# Patient Record
Sex: FEMALE | Race: BLACK OR AFRICAN AMERICAN | NOT HISPANIC OR LATINO
[De-identification: names, ages, dates, MRNs, and addresses within clinical notes are randomized per-mention and may not be internally consistent; named-entity substitution may affect disease eponyms.]

---

## 2019-08-29 ENCOUNTER — APPOINTMENT (OUTPATIENT)
Dept: ORTHOPEDIC SURGERY | Facility: CLINIC | Age: 84
End: 2019-08-29

## 2019-09-12 ENCOUNTER — APPOINTMENT (OUTPATIENT)
Dept: ORTHOPEDIC SURGERY | Facility: CLINIC | Age: 84
End: 2019-09-12
Payer: MEDICARE

## 2019-09-12 VITALS — BODY MASS INDEX: 30.32 KG/M2 | WEIGHT: 182 LBS | HEIGHT: 65 IN

## 2019-09-12 DIAGNOSIS — M16.0 BILATERAL PRIMARY OSTEOARTHRITIS OF HIP: ICD-10-CM

## 2019-09-12 DIAGNOSIS — M17.0 BILATERAL PRIMARY OSTEOARTHRITIS OF KNEE: ICD-10-CM

## 2019-09-12 PROCEDURE — 73502 X-RAY EXAM HIP UNI 2-3 VIEWS: CPT | Mod: 26,LT

## 2019-09-12 PROCEDURE — 73562 X-RAY EXAM OF KNEE 3: CPT | Mod: 50

## 2019-09-12 PROCEDURE — 99203 OFFICE O/P NEW LOW 30 MIN: CPT

## 2019-09-12 NOTE — DISCUSSION/SUMMARY
[de-identified] : The underlying pathophysiology was reviewed in great detail with the patient as well as the various treatment options, including ice, analgesics, NSAIDs, Physical therapy, steroid injections and arthoplasty. \par \par I recommend that she follow up with Dr. Ji.\par \par An order was placed for a fluoroscopic guided corticosteroid injection to the left hip.\par \par FU after injections are obtained.

## 2019-09-12 NOTE — ADDENDUM
[FreeTextEntry1] : I, Adore Coleman, acted solely as a scribe for Dr. Niraj Edward on this date 09/12/2019.

## 2019-09-12 NOTE — PHYSICAL EXAM
[Normal LLE] : Left Lower Extremity: No scars, rashes, lesions, ulcers, skin intact [Normal] : No swelling, no edema, normal pedal pulses and normal temperature [Normal Touch] : sensation intact for touch [Poor Appearance] : well-appearing [de-identified] : Right Lower Extremity\par o Knee :\par ¦ Inspection/Palpation : tenderness along the medial knee, no swelling, no deformity\par ¦ Range of Motion : 0 - 100 degrees, no crepitus\par ¦ Stability : no valgus or varus instability present on provocative testing, Lachman’s Test (-)\par o Muscle Tone : tone normal\par o Muscle Bulk : normal muscle bulk present\par o Skin : no erythema, no ecchymosis\par o Sensation : sensation to light touch intact\par o Vascular Exam : no edema, no cyanosis, dorsalis pedis artery pulse 2+, posterior tibial artery pulse 2+ \par \par Left Lower Extremity\par o Hip :\par ¦ Inspection/Palpation : no tenderness, no swelling, no deformity\par ¦ Range of Motion : hip flexion to 90 degrees with pain, external rotation to 20 degrees, no internal rotation, no crepitus\par ¦ Stability : joint stability intact\par ¦ Tests and Signs : all tests for stability normal\par o Knee :\par ¦ Inspection/Palpation : tenderness along the medial knee, no swelling, no deformity\par ¦ Range of Motion : 0 - 100 degrees, no crepitus\par ¦ Stability : no valgus or varus instability present on provocative testing, Lachman’s Test (-)\par o Muscle Tone : tone normal\par o Muscle Bulk : normal muscle bulk present\par o Skin : no erythema, no ecchymosis\par o Sensation : sensation to light touch intact\par o Vascular Exam : no edema, no cyanosis, dorsalis pedis artery pulse 2+, posterior tibial artery pulse 2+  [Acute Distress] : not in acute distress [de-identified] : o Left Hip and pelvis : AP and lateral views were obtained, there are no soft tissue abnormalities, no fractures, severe left hip osteoarthritis with collapse of left femoral head and sclerosis of the head and acetabulum, severe right hip osteoarthritis with marked joint space narrowing. \par \par o Right Knee : AP, lateral, and skyline views of the knee were obtained, there are no soft tissue abnormalities, no fractures, alignment is normal, moderate tricompartmental osteoarthritis with medial joint space narrowing, normal bone density, no bony lesions. \par \par o Left Knee : AP, lateral, and skyline views of the knee were obtained, there are no soft tissue abnormalities, no fractures, alignment is normal, moderate tricompartmental osteoarthritis with medial joint space narrowing, normal bone density, no bony lesions. \par

## 2019-09-12 NOTE — HISTORY OF PRESENT ILLNESS
[de-identified] : 89 year old female presents for an evaluation of left hip pain that she cannot attribute to any specific injury or event. Of note, she was supposed to have a hip replacement with Dr. Ji a few years ago, but she sustained a fall, causing her to decline surgery at the time. She presents to the office ambulating with the assistance of a wheelchair and is accompanied by her daughter who is contributing to her medical history. Today she rates her pain a 10/10 and describes an aching pain located in the groin region of her left hip that radiates down her left lower extremity to her left knee and that is constant in nature. Her symptoms are exacerbated with extended periods of walking, weight bearing, and with lying down, and are alleviated with heat. The patient has no other complaints at this time.

## 2019-09-12 NOTE — END OF VISIT
[FreeTextEntry3] : All medical record entries made by the Corinneibe were at my, Dr. Niraj Edward, direction and personally dictated by me on 09/12/2019. I have reviewed the chart and agree that the record accurately reflects my personal performance of the history, physical exam, assessment and plan. I have also personally directed, reviewed, and agreed with the chart.

## 2021-01-01 ENCOUNTER — EMERGENCY (EMERGENCY)
Facility: HOSPITAL | Age: 86
LOS: 1 days | Discharge: HOME | End: 2021-01-01
Attending: EMERGENCY MEDICINE | Admitting: EMERGENCY MEDICINE
Payer: MEDICARE

## 2021-01-01 VITALS
HEART RATE: 112 BPM | RESPIRATION RATE: 18 BRPM | DIASTOLIC BLOOD PRESSURE: 100 MMHG | HEIGHT: 65 IN | SYSTOLIC BLOOD PRESSURE: 170 MMHG | WEIGHT: 117.95 LBS | TEMPERATURE: 98 F | OXYGEN SATURATION: 98 %

## 2021-01-01 VITALS
DIASTOLIC BLOOD PRESSURE: 56 MMHG | HEART RATE: 67 BPM | SYSTOLIC BLOOD PRESSURE: 147 MMHG | RESPIRATION RATE: 18 BRPM | OXYGEN SATURATION: 96 % | TEMPERATURE: 98 F

## 2021-01-01 VITALS
HEART RATE: 71 BPM | DIASTOLIC BLOOD PRESSURE: 72 MMHG | RESPIRATION RATE: 16 BRPM | SYSTOLIC BLOOD PRESSURE: 142 MMHG | OXYGEN SATURATION: 98 %

## 2021-01-01 VITALS
WEIGHT: 169.98 LBS | SYSTOLIC BLOOD PRESSURE: 156 MMHG | OXYGEN SATURATION: 100 % | HEIGHT: 65 IN | HEART RATE: 96 BPM | RESPIRATION RATE: 18 BRPM | TEMPERATURE: 98 F | DIASTOLIC BLOOD PRESSURE: 85 MMHG

## 2021-01-01 VITALS
DIASTOLIC BLOOD PRESSURE: 74 MMHG | OXYGEN SATURATION: 97 % | HEART RATE: 75 BPM | TEMPERATURE: 97 F | WEIGHT: 119.93 LBS | SYSTOLIC BLOOD PRESSURE: 156 MMHG | RESPIRATION RATE: 18 BRPM | HEIGHT: 65 IN

## 2021-01-01 VITALS — HEART RATE: 100 BPM | DIASTOLIC BLOOD PRESSURE: 75 MMHG | SYSTOLIC BLOOD PRESSURE: 160 MMHG

## 2021-01-01 DIAGNOSIS — Z90.710 ACQUIRED ABSENCE OF BOTH CERVIX AND UTERUS: Chronic | ICD-10-CM

## 2021-01-01 DIAGNOSIS — Z90.49 ACQUIRED ABSENCE OF OTHER SPECIFIED PARTS OF DIGESTIVE TRACT: Chronic | ICD-10-CM

## 2021-01-01 PROCEDURE — 73030 X-RAY EXAM OF SHOULDER: CPT | Mod: 26,RT

## 2021-01-01 PROCEDURE — 73030 X-RAY EXAM OF SHOULDER: CPT

## 2021-01-01 PROCEDURE — 99284 EMERGENCY DEPT VISIT MOD MDM: CPT | Mod: 25

## 2021-01-01 PROCEDURE — 72192 CT PELVIS W/O DYE: CPT | Mod: 26,QQ

## 2021-01-01 PROCEDURE — 76376 3D RENDER W/INTRP POSTPROCES: CPT | Mod: 26

## 2021-01-01 PROCEDURE — 70450 CT HEAD/BRAIN W/O DYE: CPT | Mod: 26,MA

## 2021-01-01 PROCEDURE — 73700 CT LOWER EXTREMITY W/O DYE: CPT | Mod: MA

## 2021-01-01 PROCEDURE — 73630 X-RAY EXAM OF FOOT: CPT | Mod: 26,50

## 2021-01-01 PROCEDURE — 72170 X-RAY EXAM OF PELVIS: CPT | Mod: 26

## 2021-01-01 PROCEDURE — 72125 CT NECK SPINE W/O DYE: CPT | Mod: MA

## 2021-01-01 PROCEDURE — 73502 X-RAY EXAM HIP UNI 2-3 VIEWS: CPT | Mod: 26,LT

## 2021-01-01 PROCEDURE — 70450 CT HEAD/BRAIN W/O DYE: CPT | Mod: MA

## 2021-01-01 PROCEDURE — 76376 3D RENDER W/INTRP POSTPROCES: CPT

## 2021-01-01 PROCEDURE — 99284 EMERGENCY DEPT VISIT MOD MDM: CPT

## 2021-01-01 PROCEDURE — 72170 X-RAY EXAM OF PELVIS: CPT

## 2021-01-01 PROCEDURE — 72192 CT PELVIS W/O DYE: CPT | Mod: QQ

## 2021-01-01 PROCEDURE — 73630 X-RAY EXAM OF FOOT: CPT

## 2021-01-01 PROCEDURE — 73700 CT LOWER EXTREMITY W/O DYE: CPT | Mod: 26,LT,MA

## 2021-01-01 PROCEDURE — 72125 CT NECK SPINE W/O DYE: CPT | Mod: 26,MA

## 2021-01-01 PROCEDURE — 12011 RPR F/E/E/N/L/M 2.5 CM/<: CPT

## 2021-01-01 PROCEDURE — 73502 X-RAY EXAM HIP UNI 2-3 VIEWS: CPT

## 2021-01-01 RX ORDER — ACETAMINOPHEN 500 MG
650 TABLET ORAL ONCE
Refills: 0 | Status: COMPLETED | OUTPATIENT
Start: 2021-01-01 | End: 2021-01-01

## 2021-01-01 RX ORDER — MAGNESIUM HYDROXIDE 400 MG/1
0 TABLET, CHEWABLE ORAL
Qty: 0 | Refills: 0 | DISCHARGE

## 2021-01-01 RX ORDER — FLUTICASONE PROPIONATE 50 MCG
1 SPRAY, SUSPENSION NASAL
Qty: 0 | Refills: 0 | DISCHARGE

## 2021-01-01 RX ORDER — GABAPENTIN 400 MG/1
1 CAPSULE ORAL
Qty: 0 | Refills: 0 | DISCHARGE

## 2021-01-01 RX ORDER — SENNA PLUS 8.6 MG/1
1 TABLET ORAL
Qty: 0 | Refills: 0 | DISCHARGE

## 2021-01-01 RX ORDER — CARVEDILOL PHOSPHATE 80 MG/1
1 CAPSULE, EXTENDED RELEASE ORAL
Qty: 0 | Refills: 0 | DISCHARGE

## 2021-01-01 RX ORDER — DOCUSATE SODIUM 100 MG
1 CAPSULE ORAL
Qty: 0 | Refills: 0 | DISCHARGE

## 2021-01-01 RX ORDER — ACETAMINOPHEN 500 MG
0 TABLET ORAL
Qty: 0 | Refills: 0 | DISCHARGE

## 2021-01-01 RX ORDER — OMEPRAZOLE 10 MG/1
1 CAPSULE, DELAYED RELEASE ORAL
Qty: 0 | Refills: 0 | DISCHARGE

## 2021-01-01 RX ORDER — DIGOXIN 250 MCG
1 TABLET ORAL
Qty: 0 | Refills: 0 | DISCHARGE

## 2021-01-01 RX ORDER — FUROSEMIDE 40 MG
1 TABLET ORAL
Qty: 0 | Refills: 0 | DISCHARGE

## 2021-01-01 RX ORDER — APIXABAN 2.5 MG/1
1 TABLET, FILM COATED ORAL
Qty: 0 | Refills: 0 | DISCHARGE

## 2021-01-01 RX ADMIN — Medication 650 MILLIGRAM(S): at 17:15

## 2021-01-01 RX ADMIN — Medication 650 MILLIGRAM(S): at 16:53

## 2021-01-01 RX ADMIN — Medication 650 MILLIGRAM(S): at 04:18

## 2021-11-11 NOTE — ED PROVIDER NOTE - NSFOLLOWUPINSTRUCTIONS_ED_ALL_ED_FT
Fall Prevention for Older Adults    WHAT YOU NEED TO KNOW:    As you age, your muscles weaken and your risk for falls increases. Your risk also increases if you take medicines that make you sleepy or dizzy. You may also be at risk if you have vision or joint problems, have low blood pressure, or are not active.    DISCHARGE INSTRUCTIONS:    Call 911 or have someone else call if:   •You have fallen and are unconscious.      •You have fallen and cannot move part of your body.      Contact your healthcare provider if:   •You have fallen and have pain or a headache.      •You have questions or concerns about your condition or care.      Fall prevention tips:   •Stay active. Exercise can help strengthen your muscles and improve your balance. Your healthcare provider may recommend water aerobics, walking, or Emiliano Chi. He or she may also recommend physical therapy to improve your coordination. Never start an exercise program without asking your healthcare provider first.  Water Aerobics for Seniors       Emiliano Chi for Seniors           •Wear shoes that fit well and have soles that . Wear shoes both inside and outside. Use slippers with good . Avoid shoes with high heels.      •Use assistive devices as directed. Your healthcare provider may suggest that you use a cane or walker to help you keep your balance. You may need to have grab bars put in your bathroom near the toilet or in the shower.      •Stand or sit up slowly. This may help you keep your balance and prevent falls.      •Wear a personal alarm. This is a device that allows you to call 911 if you need help. Ask for more information on personal alarms.      •Manage your medical conditions.  Keep all appointments with your healthcare providers. Visit your eye doctor as directed.      Home safety tips:     Fall Prevention for Seniors     •Add items to prevent falls in the bathroom. Put nonslip strips on your bath or shower floor to prevent you from slipping. Use a bath mat if you do not have carpet in the bathroom. This will prevent you from falling when you step out of the bath or shower. Use a shower seat so you do not need to stand while you shower. Sit on the toilet or a chair in your bathroom to dry yourself and put on clothing. This will prevent you from losing your balance from drying or dressing yourself while you are standing.      •Keep paths clear. Remove books, shoes, and other objects from walkways and stairs. Place cords for telephones and lamps out of the way so that you do not need to walk over them. Tape them down if you cannot move them. Remove small rugs. If you cannot remove a rug, secure it with double-sided tape. This will prevent you from tripping.      •Install bright lights in your home. Use night lights to help light paths to the bathroom or kitchen. Always turn on the light before you start walking.      •Keep items you use often on shelves within reach. Do not use a step stool to help you reach an item.      •Paint or place reflective tape on the edges of your stairs. This will help you see the stairs better.      Follow up with your healthcare provider as directed: Write down your questions so you remember to ask them during your visits.

## 2021-11-11 NOTE — ED PROVIDER NOTE - OBJECTIVE STATEMENT
89 y/o F BIB ems from NH c/o pain all over body after fall from her bed, she was sitting at edge of bed and slipped from it , c/o hip pain and pain in right shoulder and both feet,

## 2021-11-11 NOTE — ED ADULT NURSE NOTE - OBJECTIVE STATEMENT
Pt is alert, brought to the ER from "[facility]" after a fall. Pt states that she was sitting at the edge of the bed when she slipped and fell to the floor. Pt with pain to her left hip area, to her back, left shoulder and left foot. Denies nausea or vomiting or LOC.

## 2021-11-11 NOTE — ED PROVIDER NOTE - PATIENT PORTAL LINK FT
You can access the FollowMyHealth Patient Portal offered by [facility] by registering at the following website: [email or web address]. By joining [employer]’s FollowMyHealth portal, you will also be able to view your health information using other applications (apps) compatible with our system.

## 2021-11-11 NOTE — ED ADULT NURSE NOTE - NSICDXPASTMEDICALHX_GEN_ALL_CORE_FT
PAST MEDICAL HISTORY:  DVT (deep venous thrombosis)     Endometrial ca     GERD (gastroesophageal reflux disease)     HTN (hypertension)     Osteoarthritis     Pacemaker

## 2021-11-11 NOTE — ED PROVIDER NOTE - CLINICAL SUMMARY MEDICAL DECISION MAKING FREE TEXT BOX
pt p/w pain in various parts ot body after fall, no physical finding of injury, all xrays negative, pt discharged back to NH

## 2021-11-26 NOTE — ED ADULT TRIAGE NOTE - CHIEF COMPLAINT QUOTE
Pt BIBA from [facility] s/p unwitnessed fall found of floor. Pt has hematoma to the forehead. Pt takes Eliquis. Tylenol was given at 1am. ISAR positive.

## 2021-11-26 NOTE — ED ADULT NURSE NOTE - CHIEF COMPLAINT QUOTE
Pt BIBA from [facility] s/p unwitnessed fall found on floor. Pt has hematoma to the forehead. Pt takes Eliquis. Unknown LOC. Pt A&O x4. Tylenol was given at 1am. ISAR positive.

## 2021-11-26 NOTE — ED PROVIDER NOTE - OBJECTIVE STATEMENT
89 y/o F with h/o DVT , endometrial CA , on Eliquis , frequent fall BIB ems from NH s/p unwitnessed fall, she was found near her bed

## 2021-11-26 NOTE — ED ADULT NURSE NOTE - NSIMPLEMENTINTERV_GEN_ALL_ED
Implemented All Fall with Harm Risk Interventions:  [place] to call system. Call bell, personal items and telephone within reach. Instruct patient to call for assistance. Room bathroom lighting operational. Non-slip footwear when patient is off stretcher. Physically safe environment: no spills, clutter or unnecessary equipment. Stretcher in lowest position, wheels locked, appropriate side rails in place. Provide visual cue, wrist band, yellow gown, etc. Monitor gait and stability. Monitor for mental status changes and reorient to person, place, and time. Review medications for side effects contributing to fall risk. Reinforce activity limits and safety measures with patient and family. Provide visual clues: red socks.

## 2021-11-26 NOTE — ED PROVIDER NOTE - PATIENT PORTAL LINK FT
You can access the FollowMyHealth Patient Portal offered by [facility] by registering at the following website: [email or web address]. By joining [employer]’s FollowMyHealth portal, you will also be able to view your health information using other applications (apps) compatible with our system.

## 2021-11-26 NOTE — ED ADULT NURSE NOTE - OBJECTIVE STATEMENT
Pt presents to the ED from [facility] s/p fall was found on the floor unwitnessed fall. Pt has hematoma to forehead. Pt c/o back pain and pain to the forehead.

## 2021-11-26 NOTE — ED PROVIDER NOTE - PHYSICAL EXAMINATION
General:     NAD, well-nourished, well-appearing  Head:     NC/AT, EOMI, oral mucosa moist  scalp : small soft tissue swelling on right side head , no skin break   Neck:     supple  Lungs:     CTA b/l, no w/r/r  CVS:     S1S2, RRR, no m/g/r  Abd:     +BS, s/nt/nd, no organomegaly  Ext:    2+ radial and pedal pulses, no c/c/e  Neuro: grossly intact

## 2021-11-26 NOTE — ED PROVIDER NOTE - NSFOLLOWUPINSTRUCTIONS_ED_ALL_ED_FT
Closed Head Injury    A closed head injury is an injury to your head that may or may not involve a traumatic brain injury (TBI). Symptoms of TBI can be short or long lasting and include headache, dizziness, interference with memory or speech, fatigue, confusion, changes in sleep, mood changes, nausea, depression/anxiety, and dulling of senses. Make sure to obtain proper rest which includes getting plenty of sleep, avoiding excessive visual stimulation, and avoiding activities that may cause physical or mental stress. Avoid any situation where there is potential for another head injury, including sports.    Fall Prevention for Older Adults    WHAT YOU NEED TO KNOW:    As you age, your muscles weaken and your risk for falls increases. Your risk also increases if you take medicines that make you sleepy or dizzy. You may also be at risk if you have vision or joint problems, have low blood pressure, or are not active.    DISCHARGE INSTRUCTIONS:    Call 911 or have someone else call if:   •You have fallen and are unconscious.      •You have fallen and cannot move part of your body.      Contact your healthcare provider if:   •You have fallen and have pain or a headache.      •You have questions or concerns about your condition or care.      Fall prevention tips:   •Stay active. Exercise can help strengthen your muscles and improve your balance. Your healthcare provider may recommend water aerobics, walking, or Emiliano Chi. He or she may also recommend physical therapy to improve your coordination. Never start an exercise program without asking your healthcare provider first.  Water Aerobics for Seniors       Emiliano Chi for Seniors           •Wear shoes that fit well and have soles that . Wear shoes both inside and outside. Use slippers with good . Avoid shoes with high heels.      •Use assistive devices as directed. Your healthcare provider may suggest that you use a cane or walker to help you keep your balance. You may need to have grab bars put in your bathroom near the toilet or in the shower.      •Stand or sit up slowly. This may help you keep your balance and prevent falls.      •Wear a personal alarm. This is a device that allows you to call 911 if you need help. Ask for more information on personal alarms.      •Manage your medical conditions.  Keep all appointments with your healthcare providers. Visit your eye doctor as directed.      Home safety tips:     Fall Prevention for Seniors     •Add items to prevent falls in the bathroom. Put nonslip strips on your bath or shower floor to prevent you from slipping. Use a bath mat if you do not have carpet in the bathroom. This will prevent you from falling when you step out of the bath or shower. Use a shower seat so you do not need to stand while you shower. Sit on the toilet or a chair in your bathroom to dry yourself and put on clothing. This will prevent you from losing your balance from drying or dressing yourself while you are standing.      •Keep paths clear. Remove books, shoes, and other objects from walkways and stairs. Place cords for telephones and lamps out of the way so that you do not need to walk over them. Tape them down if you cannot move them. Remove small rugs. If you cannot remove a rug, secure it with double-sided tape. This will prevent you from tripping.      •Install bright lights in your home. Use night lights to help light paths to the bathroom or kitchen. Always turn on the light before you start walking.      •Keep items you use often on shelves within reach. Do not use a step stool to help you reach an item.      •Paint or place reflective tape on the edges of your stairs. This will help you see the stairs better.      Follow up with your healthcare provider as directed: Write down your questions so you remember to ask them during your visits.

## 2021-11-27 NOTE — ED PROVIDER NOTE - ATTENDING CONTRIBUTION TO CARE
Rogers with JAVIER Gill. 89 y/oF with PMH HTN, endometrial CA, GERD BIBEMS from physical rehab facility s/p mechanical fall tonight with face laceration, left hip pain.    Head/facial CT, hip/pelvis/femur CT - all imaging negative  tylenol given for pain  face lac repaired with dermabond  vitals stable, transport to be arranged for return to rehab facility    I performed a face to face bedside interview with patient regarding history of present illness, review of symptoms and past medical history. I completed an independent physical exam.  I have discussed the patient's plan of care with Physician Assistant (PA). I agree with note as stated above, having amended the EMR as needed to reflect my findings.   This includes History of Present Illness, HIV, Past Medical/Surgical/Family/Social History, Allergies and Home Medications, Review of Systems, Physical Exam, and any Progress Notes during the time I functioned as the attending physician for this patient.

## 2021-11-27 NOTE — ED ADULT TRIAGE NOTE - CHIEF COMPLAINT QUOTE
ISAR positive, BIBA from Nursing facility, S/p fall, pt complaining of left hip pain, also suffered a wound to the head. Pt on blood thinners.

## 2021-11-27 NOTE — ED ADULT NURSE NOTE - NSICDXPASTMEDICALHX_GEN_ALL_CORE_FT
PAST MEDICAL HISTORY:  DVT (deep venous thrombosis)     Endometrial ca     GERD (gastroesophageal reflux disease)     HTN (hypertension)     Osteoarthritis     Pacemaker      PAST MEDICAL HISTORY:  Dementia     DVT (deep venous thrombosis)     Endometrial ca     GERD (gastroesophageal reflux disease)     HTN (hypertension)     Osteoarthritis     Pacemaker

## 2021-11-27 NOTE — ED PROVIDER NOTE - TOBACCO USE
Abdomen soft, nontender, nondistended, bowel sounds present in all 4 quadrants.
Unknown if ever smoked

## 2021-11-27 NOTE — ED PROVIDER NOTE - NSFOLLOWUPINSTRUCTIONS_ED_ALL_ED_FT
Laceration    A laceration is a cut that goes through all of the layers of the skin and into the tissue that is right under the skin. Some lacerations heal on their own. Others need to be closed with skin adhesive strips, skin glue, stitches (sutures), or staples. Skin glue was applied. Keep the area dry. It will fall off on its own.     SEEK IMMEDIATE MEDICAL CARE IF YOU HAVE ANY OF THE FOLLOWING SYMPTOMS: swelling around the wound, worsening pain, drainage from the wound, red streaking going away from your wound, inability to move finger or toe near the laceration, or discoloration of skin near the laceration.     Follow up with your PMD within 24-48 hrs hours.  Rest, Take Tylenol 650mg every 4-6 hours as needed for pain . You may have a headache associated with nausea and lightheadedness in the next few hours/days. This is called a concussion and does not warrant return to the Emergency department unless you develop significant worsening of pain, profuse vomiting, dizziness, changes in vision, difficulty walking/speaking, weakness or numbness to your extremities. Worsening or new concerning symptoms return to the emergency department.

## 2021-11-27 NOTE — ED PROVIDER NOTE - PATIENT PORTAL LINK FT
You can access the FollowMyHealth Patient Portal offered by [facility] by registering at the following website: [email or web address]. By joining [employer]’s FollowMyHealth portal, you will also be able to view your health information using other applications (apps) compatible with our system.

## 2021-11-27 NOTE — ED PROVIDER NOTE - OBJECTIVE STATEMENT
89 y/oF with PMH HTN, multiple falls BIBEMS from physical rehab facility s/p fall with laceration/bruising above left eyebrow and c/o left hip pain. Pt. on blood thinners, bleeding easily controlled from head wound. Got collateral information from adult son listed in emergency contact section. Pt. has no formal diagnosis of dementia but sometimes does not make sense at baseline, son reports patient is known to strategically evade questioning when she doesn't like the topic of conversation.     Pt. reports she was sleeping while sitting in wheelchair with legs forward on the bed as the mattress is not comfortable where she is staying. She reports she thought the wheelchair was locked when she tried to get up but the wheels were not locked and she tripping as the wheelchair moved and fell to the left side.

## 2021-11-27 NOTE — ED PROVIDER NOTE - CARE PLAN
1 Principal Discharge DX:	CHI (closed head injury), initial encounter  Secondary Diagnosis:	Facial laceration, initial encounter

## 2021-11-27 NOTE — ED PROVIDER NOTE - LEFT FACE
left supraorbital ridge ttp, 1.5cm linear, shallow laceration (bleeding easily controlled) with ecchymosis and edema/TENDERNESS TO PALPATION

## 2021-11-27 NOTE — ED ADULT NURSE NOTE - OBJECTIVE STATEMENT
89 yr old female BIB EMS from NH s/p fall. Pt A&Ox2, on blood thinners. Pt has a laceration to forehead.  Pt denies numbness, dizziness, nausea, vomiting, chest pain.

## 2021-11-27 NOTE — ED PROVIDER NOTE - CLINICAL SUMMARY MEDICAL DECISION MAKING FREE TEXT BOX
89 y/oF with PMH HTN, endometrial CA, GERD BIBEMS from physical rehab facility s/p mechanical fall tonight with face laceration, left hip pain.      Head/facial CT, hip/pelvis/femur CT - all imaging negative  tylenol given for pain  face lac repaired with dermabond  vitals stable, transport to be arranged for return to rehab facility

## 2021-11-27 NOTE — ED PROCEDURE NOTE - NS ED ATTENDING STATEMENT MOD
Attending Only
elevated Cr 1.63 (baseline Cr 1) BUN/Cr 18  - s/p 1L fluids in the ED  - UA pending  - avoid NSAIDs, ACE/ARBs, nephrotoxic drugs, and contrast  - daily weight, strict I/O  - monitor Cr

## 2021-11-27 NOTE — ED PROVIDER NOTE - MUSCULOSKELETAL MINIMAL EXAM
left hip and femur,, inspection unremarkable, no shortening or rotation noted of LLE/normal range of motion

## 2021-11-29 NOTE — ED ADULT NURSE NOTE - CHPI ED NUR SYMPTOMS POS
60 year old M no pmhx presenting to ED for evaluation s/p mechanical trip and fall, (+)head trauma, not on any anitcoags about 1 hour prior to arrival to ED. Pt reports he drank 1 solo cup of vodka with cranberry juice, pt was hanging aleksandar lights and states he tripped over tree branch outside causing him to fall forward, hit his head on ground, at that time no loc. Pt reports shortly after he went inside smoked marijuana, pt became nauseous and had syncopal episode.  Unknown tetanus status. Denies any chest pain, sob, dizziness, vomiting, neck pain, back pain, weakness, numbness/tingling.
PAIN

## 2021-11-30 NOTE — CHART NOTE - NSCHARTNOTEFT_GEN_A_CORE
SW called patients residence, [facility], to follow up on patient. Patient presented to ED on 11/26 & 11/27/21 due to falls.  NELLY left voicemail for staff member Ainsley requesting a call back, provided contact information.

## 2022-01-01 ENCOUNTER — INPATIENT (INPATIENT)
Facility: HOSPITAL | Age: 87
LOS: 0 days | DRG: 871 | End: 2022-01-09
Attending: INTERNAL MEDICINE | Admitting: INTERNAL MEDICINE
Payer: MEDICARE

## 2022-01-01 VITALS
HEART RATE: 85 BPM | DIASTOLIC BLOOD PRESSURE: 72 MMHG | OXYGEN SATURATION: 90 % | SYSTOLIC BLOOD PRESSURE: 135 MMHG | RESPIRATION RATE: 22 BRPM

## 2022-01-01 VITALS
SYSTOLIC BLOOD PRESSURE: 120 MMHG | DIASTOLIC BLOOD PRESSURE: 92 MMHG | TEMPERATURE: 98 F | RESPIRATION RATE: 21 BRPM | HEART RATE: 59 BPM | HEIGHT: 65 IN | WEIGHT: 108.91 LBS

## 2022-01-01 DIAGNOSIS — Z90.710 ACQUIRED ABSENCE OF BOTH CERVIX AND UTERUS: Chronic | ICD-10-CM

## 2022-01-01 DIAGNOSIS — Z90.49 ACQUIRED ABSENCE OF OTHER SPECIFIED PARTS OF DIGESTIVE TRACT: Chronic | ICD-10-CM

## 2022-01-01 DIAGNOSIS — K92.0 HEMATEMESIS: ICD-10-CM

## 2022-01-01 LAB
ALBUMIN SERPL ELPH-MCNC: 2.2 G/DL — LOW (ref 3.3–5)
ALBUMIN SERPL ELPH-MCNC: 2.7 G/DL — LOW (ref 3.3–5)
ALP SERPL-CCNC: 108 U/L — SIGNIFICANT CHANGE UP (ref 40–120)
ALP SERPL-CCNC: 133 U/L — HIGH (ref 40–120)
ALT FLD-CCNC: 317 U/L — HIGH (ref 10–45)
ALT FLD-CCNC: 331 U/L — HIGH (ref 10–45)
ANION GAP SERPL CALC-SCNC: 25 MMOL/L — HIGH (ref 5–17)
ANION GAP SERPL CALC-SCNC: 27 MMOL/L — HIGH (ref 5–17)
APPEARANCE UR: ABNORMAL
APTT BLD: 55.7 SEC — HIGH (ref 27.5–35.5)
AST SERPL-CCNC: 641 U/L — HIGH (ref 10–40)
AST SERPL-CCNC: 652 U/L — HIGH (ref 10–40)
BACTERIA # UR AUTO: ABNORMAL /HPF
BASOPHILS # BLD AUTO: 0.05 K/UL — SIGNIFICANT CHANGE UP (ref 0–0.2)
BASOPHILS NFR BLD AUTO: 0.5 % — SIGNIFICANT CHANGE UP (ref 0–2)
BILIRUB SERPL-MCNC: 4 MG/DL — HIGH (ref 0.2–1.2)
BILIRUB SERPL-MCNC: 4.9 MG/DL — HIGH (ref 0.2–1.2)
BILIRUB UR-MCNC: ABNORMAL
BLD GP AB SCN SERPL QL: SIGNIFICANT CHANGE UP
BUN SERPL-MCNC: 89 MG/DL — HIGH (ref 7–23)
BUN SERPL-MCNC: 92 MG/DL — HIGH (ref 7–23)
CALCIUM SERPL-MCNC: 8.2 MG/DL — LOW (ref 8.4–10.5)
CALCIUM SERPL-MCNC: 8.3 MG/DL — LOW (ref 8.4–10.5)
CHLORIDE SERPL-SCNC: 106 MMOL/L — SIGNIFICANT CHANGE UP (ref 96–108)
CHLORIDE SERPL-SCNC: 109 MMOL/L — HIGH (ref 96–108)
CO2 SERPL-SCNC: 13 MMOL/L — LOW (ref 22–31)
CO2 SERPL-SCNC: 14 MMOL/L — LOW (ref 22–31)
COLOR SPEC: YELLOW — SIGNIFICANT CHANGE UP
COMMENT - URINE: SIGNIFICANT CHANGE UP
CREAT SERPL-MCNC: 5.41 MG/DL — HIGH (ref 0.5–1.3)
CREAT SERPL-MCNC: 5.54 MG/DL — HIGH (ref 0.5–1.3)
DIFF PNL FLD: ABNORMAL
DIGOXIN SERPL-MCNC: 0.1 NG/ML — LOW (ref 0.8–2)
EOSINOPHIL # BLD AUTO: 0.43 K/UL — SIGNIFICANT CHANGE UP (ref 0–0.5)
EOSINOPHIL NFR BLD AUTO: 4.2 % — SIGNIFICANT CHANGE UP (ref 0–6)
EPI CELLS # UR: SIGNIFICANT CHANGE UP
GLUCOSE BLDC GLUCOMTR-MCNC: 96 MG/DL — SIGNIFICANT CHANGE UP (ref 70–99)
GLUCOSE SERPL-MCNC: 180 MG/DL — HIGH (ref 70–99)
GLUCOSE SERPL-MCNC: 6 MG/DL — CRITICAL LOW (ref 70–99)
GLUCOSE UR QL: NEGATIVE — SIGNIFICANT CHANGE UP
HCT VFR BLD CALC: 41.4 % — SIGNIFICANT CHANGE UP (ref 34.5–45)
HGB BLD-MCNC: 12.5 G/DL — SIGNIFICANT CHANGE UP (ref 11.5–15.5)
HYALINE CASTS # UR AUTO: ABNORMAL
IMM GRANULOCYTES NFR BLD AUTO: 0.6 % — SIGNIFICANT CHANGE UP (ref 0–1.5)
INR BLD: 3.8 RATIO — HIGH (ref 0.88–1.16)
KETONES UR-MCNC: ABNORMAL
LACTATE SERPL-SCNC: 14.6 MMOL/L — CRITICAL HIGH (ref 0.7–2)
LACTATE SERPL-SCNC: 15.2 MMOL/L — CRITICAL HIGH (ref 0.7–2)
LEUKOCYTE ESTERASE UR-ACNC: ABNORMAL
LIDOCAIN IGE QN: 101 U/L — SIGNIFICANT CHANGE UP (ref 73–393)
LYMPHOCYTES # BLD AUTO: 0.36 K/UL — LOW (ref 1–3.3)
LYMPHOCYTES # BLD AUTO: 3.5 % — LOW (ref 13–44)
MCHC RBC-ENTMCNC: 30.2 GM/DL — LOW (ref 32–36)
MCHC RBC-ENTMCNC: 31.6 PG — SIGNIFICANT CHANGE UP (ref 27–34)
MCV RBC AUTO: 104.5 FL — HIGH (ref 80–100)
MONOCYTES # BLD AUTO: 0.53 K/UL — SIGNIFICANT CHANGE UP (ref 0–0.9)
MONOCYTES NFR BLD AUTO: 5.2 % — SIGNIFICANT CHANGE UP (ref 2–14)
NEUTROPHILS # BLD AUTO: 8.78 K/UL — HIGH (ref 1.8–7.4)
NEUTROPHILS NFR BLD AUTO: 86 % — HIGH (ref 43–77)
NITRITE UR-MCNC: NEGATIVE — SIGNIFICANT CHANGE UP
NRBC # BLD: 0 /100 WBCS — SIGNIFICANT CHANGE UP (ref 0–0)
OB PNL STL: NEGATIVE — SIGNIFICANT CHANGE UP
PH UR: 6 — SIGNIFICANT CHANGE UP (ref 5–8)
PLATELET # BLD AUTO: 40 K/UL — LOW (ref 150–400)
POTASSIUM SERPL-MCNC: 7.9 MMOL/L — CRITICAL HIGH (ref 3.5–5.3)
POTASSIUM SERPL-MCNC: 8.3 MMOL/L — CRITICAL HIGH (ref 3.5–5.3)
POTASSIUM SERPL-SCNC: 7.9 MMOL/L — CRITICAL HIGH (ref 3.5–5.3)
POTASSIUM SERPL-SCNC: 8.3 MMOL/L — CRITICAL HIGH (ref 3.5–5.3)
PROT SERPL-MCNC: 4.6 G/DL — LOW (ref 6–8.3)
PROT SERPL-MCNC: 5.6 G/DL — LOW (ref 6–8.3)
PROT UR-MCNC: 500 MG/DL
PROTHROM AB SERPL-ACNC: 43.2 SEC — HIGH (ref 10.6–13.6)
RBC # BLD: 3.96 M/UL — SIGNIFICANT CHANGE UP (ref 3.8–5.2)
RBC # FLD: 15.8 % — HIGH (ref 10.3–14.5)
RBC CASTS # UR COMP ASSIST: SIGNIFICANT CHANGE UP /HPF (ref 0–4)
SARS-COV-2 RNA SPEC QL NAA+PROBE: DETECTED
SODIUM SERPL-SCNC: 147 MMOL/L — HIGH (ref 135–145)
SODIUM SERPL-SCNC: 147 MMOL/L — HIGH (ref 135–145)
SP GR SPEC: 1.02 — SIGNIFICANT CHANGE UP (ref 1.01–1.02)
UROBILINOGEN FLD QL: 4
WBC # BLD: 10.21 K/UL — SIGNIFICANT CHANGE UP (ref 3.8–10.5)
WBC # FLD AUTO: 10.21 K/UL — SIGNIFICANT CHANGE UP (ref 3.8–10.5)
WBC UR QL: ABNORMAL /HPF (ref 0–5)

## 2022-01-01 PROCEDURE — 74176 CT ABD & PELVIS W/O CONTRAST: CPT | Mod: MA

## 2022-01-01 PROCEDURE — 96365 THER/PROPH/DIAG IV INF INIT: CPT

## 2022-01-01 PROCEDURE — 93010 ELECTROCARDIOGRAM REPORT: CPT

## 2022-01-01 PROCEDURE — 96367 TX/PROPH/DG ADDL SEQ IV INF: CPT

## 2022-01-01 PROCEDURE — 36415 COLL VENOUS BLD VENIPUNCTURE: CPT

## 2022-01-01 PROCEDURE — 96375 TX/PRO/DX INJ NEW DRUG ADDON: CPT

## 2022-01-01 PROCEDURE — 83690 ASSAY OF LIPASE: CPT

## 2022-01-01 PROCEDURE — 71045 X-RAY EXAM CHEST 1 VIEW: CPT

## 2022-01-01 PROCEDURE — 80162 ASSAY OF DIGOXIN TOTAL: CPT

## 2022-01-01 PROCEDURE — 93005 ELECTROCARDIOGRAM TRACING: CPT

## 2022-01-01 PROCEDURE — 82272 OCCULT BLD FECES 1-3 TESTS: CPT

## 2022-01-01 PROCEDURE — 70450 CT HEAD/BRAIN W/O DYE: CPT | Mod: 26,MA

## 2022-01-01 PROCEDURE — 99292 CRITICAL CARE ADDL 30 MIN: CPT

## 2022-01-01 PROCEDURE — 85610 PROTHROMBIN TIME: CPT

## 2022-01-01 PROCEDURE — 82962 GLUCOSE BLOOD TEST: CPT

## 2022-01-01 PROCEDURE — 87040 BLOOD CULTURE FOR BACTERIA: CPT

## 2022-01-01 PROCEDURE — 86901 BLOOD TYPING SEROLOGIC RH(D): CPT

## 2022-01-01 PROCEDURE — 99291 CRITICAL CARE FIRST HOUR: CPT | Mod: 25

## 2022-01-01 PROCEDURE — 99285 EMERGENCY DEPT VISIT HI MDM: CPT | Mod: CS

## 2022-01-01 PROCEDURE — 87635 SARS-COV-2 COVID-19 AMP PRB: CPT

## 2022-01-01 PROCEDURE — 81001 URINALYSIS AUTO W/SCOPE: CPT

## 2022-01-01 PROCEDURE — 83605 ASSAY OF LACTIC ACID: CPT

## 2022-01-01 PROCEDURE — 85025 COMPLETE CBC W/AUTO DIFF WBC: CPT

## 2022-01-01 PROCEDURE — 74176 CT ABD & PELVIS W/O CONTRAST: CPT | Mod: 26,MA

## 2022-01-01 PROCEDURE — 71045 X-RAY EXAM CHEST 1 VIEW: CPT | Mod: 26

## 2022-01-01 PROCEDURE — 86900 BLOOD TYPING SEROLOGIC ABO: CPT

## 2022-01-01 PROCEDURE — 86850 RBC ANTIBODY SCREEN: CPT

## 2022-01-01 PROCEDURE — 85730 THROMBOPLASTIN TIME PARTIAL: CPT

## 2022-01-01 PROCEDURE — 80053 COMPREHEN METABOLIC PANEL: CPT

## 2022-01-01 PROCEDURE — 87086 URINE CULTURE/COLONY COUNT: CPT

## 2022-01-01 PROCEDURE — 70450 CT HEAD/BRAIN W/O DYE: CPT | Mod: MA

## 2022-01-01 RX ORDER — DEXTROSE 50 % IN WATER 50 %
100 SYRINGE (ML) INTRAVENOUS ONCE
Refills: 0 | Status: COMPLETED | OUTPATIENT
Start: 2022-01-01 | End: 2022-01-01

## 2022-01-01 RX ORDER — ONDANSETRON 8 MG/1
4 TABLET, FILM COATED ORAL ONCE
Refills: 0 | Status: COMPLETED | OUTPATIENT
Start: 2022-01-01 | End: 2022-01-01

## 2022-01-01 RX ORDER — CALCIUM GLUCONATE 100 MG/ML
1 VIAL (ML) INTRAVENOUS ONCE
Refills: 0 | Status: COMPLETED | OUTPATIENT
Start: 2022-01-01 | End: 2022-01-01

## 2022-01-01 RX ORDER — SODIUM CHLORIDE 9 MG/ML
1800 INJECTION INTRAMUSCULAR; INTRAVENOUS; SUBCUTANEOUS ONCE
Refills: 0 | Status: COMPLETED | OUTPATIENT
Start: 2022-01-01 | End: 2022-01-01

## 2022-01-01 RX ORDER — PANTOPRAZOLE SODIUM 20 MG/1
80 TABLET, DELAYED RELEASE ORAL ONCE
Refills: 0 | Status: COMPLETED | OUTPATIENT
Start: 2022-01-01 | End: 2022-01-01

## 2022-01-01 RX ORDER — SODIUM CHLORIDE 9 MG/ML
1000 INJECTION INTRAMUSCULAR; INTRAVENOUS; SUBCUTANEOUS ONCE
Refills: 0 | Status: COMPLETED | OUTPATIENT
Start: 2022-01-01 | End: 2022-01-01

## 2022-01-01 RX ORDER — VANCOMYCIN HCL 1 G
1000 VIAL (EA) INTRAVENOUS ONCE
Refills: 0 | Status: COMPLETED | OUTPATIENT
Start: 2022-01-01 | End: 2022-01-01

## 2022-01-01 RX ORDER — AZTREONAM 2 G
2000 VIAL (EA) INJECTION ONCE
Refills: 0 | Status: COMPLETED | OUTPATIENT
Start: 2022-01-01 | End: 2022-01-01

## 2022-01-01 RX ADMIN — SODIUM CHLORIDE 1000 MILLILITER(S): 9 INJECTION INTRAMUSCULAR; INTRAVENOUS; SUBCUTANEOUS at 02:51

## 2022-01-01 RX ADMIN — Medication 250 MILLIGRAM(S): at 02:57

## 2022-01-01 RX ADMIN — SODIUM CHLORIDE 1800 MILLILITER(S): 9 INJECTION INTRAMUSCULAR; INTRAVENOUS; SUBCUTANEOUS at 02:57

## 2022-01-01 RX ADMIN — Medication 100 MILLILITER(S): at 02:20

## 2022-01-01 RX ADMIN — SODIUM CHLORIDE 1800 MILLILITER(S): 9 INJECTION INTRAMUSCULAR; INTRAVENOUS; SUBCUTANEOUS at 01:32

## 2022-01-01 RX ADMIN — Medication 100 GRAM(S): at 02:28

## 2022-01-01 RX ADMIN — Medication 2000 MILLIGRAM(S): at 02:57

## 2022-01-01 RX ADMIN — Medication 1 GRAM(S): at 02:45

## 2022-01-01 RX ADMIN — Medication 100 MILLIGRAM(S): at 02:15

## 2022-01-01 RX ADMIN — ONDANSETRON 4 MILLIGRAM(S): 8 TABLET, FILM COATED ORAL at 01:30

## 2022-01-01 RX ADMIN — PANTOPRAZOLE SODIUM 80 MILLIGRAM(S): 20 TABLET, DELAYED RELEASE ORAL at 01:30

## 2022-01-09 PROBLEM — F03.90 UNSPECIFIED DEMENTIA, UNSPECIFIED SEVERITY, WITHOUT BEHAVIORAL DISTURBANCE, PSYCHOTIC DISTURBANCE, MOOD DISTURBANCE, AND ANXIETY: Chronic | Status: ACTIVE | Noted: 2021-01-01

## 2022-01-09 NOTE — ED PROVIDER NOTE - CRITICAL CARE ATTENDING CONTRIBUTION TO CARE
Patient was critically ill with a high probability of imminent or life threatening deterioration.  direct patient care (not related to procedure), additional history taking, interpretation of diagnostic studies, documentation, consultation with other physicians, telephone consultation with the patient's family  I have personally provided the amount of critical care time documented below excluding time spent on separate procedures.

## 2022-01-09 NOTE — ED PROVIDER NOTE - CARDIAC, MLM
Normal rate, regular rhythm.  Heart sounds S1, S2.  No murmurs, rubs or gallops. Normal rate, regular rhythm.  Heart sounds S1, S2.  No murmurs, rubs or gallops. Blood pressure normal 121/92.

## 2022-01-09 NOTE — ED PROVIDER NOTE - CONSTITUTIONAL DISTRESS
pt eyes open to verbal stim. she is pulling off her mask. Not following commands. No resp distress./no apparent

## 2022-01-09 NOTE — ED ADULT NURSE REASSESSMENT NOTE - NS ED NURSE REASSESS COMMENT FT1
still awaiting on the ME decision. patient still in room. report given to incoming RN.
patient noted to be unresponsive and pulseless shortly after returning from imaging.  MD Aguirre called to the bedside and MD pronounced patient at 04:07. no resuscitation attempted. MD will contact patient next of kin.

## 2022-01-09 NOTE — ED PROVIDER NOTE - CLINICAL SUMMARY MEDICAL DECISION MAKING FREE TEXT BOX
89F PMH HTN, Afib, PPM, CKD3, Dementia, Osteoarthritis presents to the ED for coffee ground emesis earlier today and most recently hypoxia. EMS states that she was hypotensive and altered. Not much insight into pt baseline. Pt is COVID +; however, unclear from what date.   Exam as stated. Labs with abnormal values. 1amp dextrose given as we attempt to repeat fingerstick. No blood extractable from digits. Calcium gluconate given IV. EKG reviewed. Repeating CMP and Lactate. Fluids already administered per sepsis protocol. Bronwyn Hugger, etc.    Labs with metabolic derangement. Will require correction. Pt on dig. Level <.1. Attempted to call family to discuss pt clinical status as critical, however, no answer. D/W Dr. Zuniga E-ICU. Called for ICU PA    Admit. 89F PMH HTN, Afib, PPM, CKD3, Dementia, Osteoarthritis presents to the ED for coffee ground emesis earlier today and most recently hypoxia. EMS states that she was hypotensive and altered. Not much insight into pt baseline. Pt is COVID +; however, unclear from what date.   Exam as stated. Labs with abnormal values. 1amp dextrose given as we attempt to repeat fingerstick. No blood extractable from digits. Calcium gluconate given IV. EKG reviewed. Repeating CMP and Lactate. Fluids already administered per sepsis protocol. Bronwyn Hugger, etc.    Labs with metabolic derangement. Will require correction. Pt on dig. Level <.1. Attempted to call family to discuss pt clinical status as critical and near end of life, however, no answer. D/W Dr. Zuniga E-ICU. Called for ICU PA    Pt  4:07AM as I was called into the room that the patient is pulseless. Resuscitation not attempted. It was reported to me that pt returned from CT scan and was not placed back on the monitor. When ICU PA came to assess, she was pulseless. Called family again. No answer. Will keep trying. 89F PMH HTN, Afib, PPM, CKD3, Dementia, Osteoarthritis presents to the ED for coffee ground emesis earlier today and most recently hypoxia. EMS states that she was hypotensive and altered. Not much insight into pt baseline. Pt is COVID +; however, unclear from what date.   Exam as stated. Labs with abnormal values. 1amp dextrose given as we attempt to repeat fingerstick. No blood extractable from digits. Calcium gluconate given IV. EKG reviewed. Repeating CMP and Lactate. Fluids already administered per sepsis protocol. Bronwyn Hugger, etc.    Labs with metabolic derangement. Will require correction. Pt on dig. Level <.1. Attempted to call family to discuss pt clinical status as critical and near end of life, however, no answer. D/W Dr. Efrain ORSARIO-ICU. Called for ICU PA    Pt pronounced 4:07AM 89F PMH HTN, Afib, PPM, CKD3, Dementia, Osteoarthritis presents to the ED for coffee ground emesis earlier today and most recently hypoxia. EMS states that she was hypotensive and altered. Not much insight into pt baseline. Pt is COVID +; however, unclear from what date.   Exam as stated. Labs with abnormal values. 1amp dextrose given as we attempt to repeat fingerstick. No blood extractable from digits. Calcium gluconate given IV. EKG reviewed. Repeating CMP and Lactate. Fluids already administered per sepsis protocol. Bronwyn Hugger, etc.    Labs with metabolic derangement. Will require correction. Pt on dig. Level <.1. Attempted to call family to discuss pt clinical status as critical and near end of life, however, no answer. D/W Dr. Efrain ROSARIO-ICU. Called for ICU PA    Pt pronounced 4:07AM    Still unable to reach emergency contact Fracisco Amor.   Called Daughter Dee Mortimer. She answered and the news was delivered. She endorses that she was afraid this was nearing as she was not doing well lately at the facility. 89F PMH HTN, Afib, PPM, CKD3, Dementia, Osteoarthritis presents to the ED for coffee ground emesis earlier today and most recently hypoxia. EMS states that she was hypotensive and altered. Not much insight into pt baseline. Pt is COVID +; however, unclear from what date.   Exam as stated. Labs with abnormal values. 1amp dextrose given as we attempt to repeat fingerstick. No blood extractable from digits. Calcium gluconate given IV. EKG reviewed. Repeating CMP and Lactate. Fluids already administered per sepsis protocol. Bronwyn Hugger, etc.    Labs with metabolic derangement. Will require correction. Pt on dig. Level <.1. Imaging, no obv infiltrate. Probable right base?? Will CT abd due to vomiting history and elevated Bili, LFTs, creatinine, etc. Attempted to call family to discuss pt clinical status as critical and near end of life, however, no answer. D/W Dr. Zuniga E-ICU and ICU PA for admission.     Pt pronounced 4:07AM as there was no spontaneous respiration or pulse. Pt was stiff. Bronwyn hugger on. No corneal reflex.     Still unable to reach emergency contact Fracisco Amor.   445pm: Called Daughter Dee Mortimer. She answered and the news was delivered. She endorses that she was afraid this was nearing as she was not doing well lately at the facility. She will contact her brother. 89F PMH HTN, Afib, PPM, CKD3, Dementia, Osteoarthritis presents to the ED for coffee ground emesis earlier today and most recently hypoxia. EMS states that she was hypotensive and altered. Not much insight into pt baseline. Pt is COVID +; however, unclear from what date.   Exam as stated. Labs with abnormal values. 1amp dextrose given as we attempt to repeat fingerstick. No blood extractable from digits. Calcium gluconate given IV. EKG reviewed. Repeating CMP and Lactate. Fluids already administered per sepsis protocol. Bronwyn Hugger, etc.    Labs with metabolic derangement. Will require correction. Pt on dig. Level <.1. Imaging, no obv infiltrate. Probable right base?? Will CT abd due to vomiting history and elevated Bili, LFTs, creatinine, etc. Attempted to call family to discuss pt clinical status as critical and near end of life, however, no answer. D/W Dr. Zuniga E-ICU and ICU PA for admission.     Pt pronounced 4:07AM as there was no spontaneous respiration or pulse. Pt was stiff. Bronwyn hugger on. No corneal reflex.     Still unable to reach emergency contact Fracisco Amor.   445pm: Called Daughter Dee Mortimer. She answered and the news was delivered. She endorses that she was afraid this was nearing as she was not doing well lately at the facility. She will contact her brother.    Death cert completed: [id number] 89F PMH HTN, Afib, PPM, CKD3, Dementia, Osteoarthritis presents to the ED for coffee ground emesis earlier today and most recently hypoxia. EMS states that she was hypotensive and altered. Not much insight into pt baseline. Pt is COVID +; however, unclear from what date.   Vitals in ED acceptable. BP / O2 sat/ HR WNL.   Exam as stated. Pending guaiac result - RN will send. Labs with greatly abnormal values. H/H nml. 1amp dextrose given as we attempt to repeat fingerstick. No blood extractable from digits. EJ Placed by me. Rechecked glucose there = 92. Calcium gluconate given IV pending EKG. EKG reviewed. Repeating CMP and Lactate. Fluids already administered per sepsis protocol. Bronwyn Hugger, etc.    Labs with metabolic derangement. Will require correction. Pt on dig. Level <.1. Imaging, no obv infiltrate. Probable right base?? Will CT abd due to vomiting history and elevated Bili, LFTs, creatinine, etc. Attempted to call family to discuss pt clinical status as critical and near end of life, however, no answer. No obv need for intubation or central line but while we seek etiology, consider sepsis due to hypothermia and reported hypotension and hypoglycemia.  D/W Dr. Zuniga E-ICU and ICU PA for admission. EICU recc add TSH and give LR instead of saline if more fluid required.     After returning from CT, PA from ICU came to assess. Pt was . Upon re-assessment, there was no spontaneous respiration or pulse. Pt was stiff. She is cold. Bronwyn hugger on. No corneal reflex. No rhythm strip since pt returned from CT. Pt pronounced 4:07AM.     Still unable to reach emergency contact Fracisco Amor.   445pm: Called Daughter Dee Mortimer. She answered and the news was delivered. She endorses that she was afraid this was nearing as she was not doing well lately at the facility. She will contact her brother.    Death cert completed: [id number] 89F PMH HTN, Afib, PPM, CKD3, Dementia, Osteoarthritis presents to the ED for coffee ground emesis earlier today and most recently hypoxia. EMS states that she was hypotensive and altered. Not much insight into pt baseline. Pt is COVID +; however, unclear from what date.   Vitals in ED acceptable. BP / O2 sat/ HR WNL.   Exam as stated. Pending guaiac result - RN will send. Labs with greatly abnormal values. H/H nml. 1amp dextrose given as we attempt to repeat fingerstick. No blood extractable from digits. EJ Placed by me. Rechecked glucose there = 92. Calcium gluconate given IV pending EKG. EKG reviewed. Repeating CMP and Lactate. Fluids already administered per sepsis protocol. Bronwyn Hugger, etc.    Labs with metabolic derangement. Will require correction. Pt on dig. Level <.1. Imaging, no obv infiltrate. Probable right base?? Will CT abd due to vomiting history and elevated Bili, LFTs, creatinine, etc. Attempted to call family to discuss pt clinical status as critical and near end of life, however, no answer. No obv need for intubation or central line but while we seek etiology, consider sepsis due to hypothermia and reported hypotension and hypoglycemia.  D/W Dr. Zuniga E-ICU and ICU PA for admission. EICU recc add TSH and give LR instead of saline if more fluid required.     After returning from CT, PA from ICU came to assess. Pt was . Upon re-assessment, there was no spontaneous respiration or pulse. Pt was stiff. She is cold. Bronwyn hugger on. No corneal reflex. Pt pronounced 4:07AM. In my professional medical opinion, in view of pt age and comorbidities along with lactate level of 15 and other significant metabolic derangements, resuscitation would be futile.    Still unable to reach emergency contact Fracisco Amor. Attempted multiple times.   Called Daughter Dee Mortimer. She answered and the news was delivered. She endorses that she was afraid this was nearing as she was not doing well lately at the facility. She will contact her brother.    Death cert completed: [id number]

## 2022-01-09 NOTE — ED ADULT NURSE NOTE - NSICDXPASTMEDICALHX_GEN_ALL_CORE_FT
PAST MEDICAL HISTORY:  Dementia     DVT (deep venous thrombosis)     Endometrial ca     GERD (gastroesophageal reflux disease)     HTN (hypertension)     Osteoarthritis     Pacemaker

## 2022-01-09 NOTE — ED PROVIDER NOTE - SKIN COLOR
Stage 1 right gluteal skin decub. Left calf ulceration. Clean based. Stage 1 right gluteal skin decub. Left calf ulceration, large. Clean based.

## 2022-01-09 NOTE — ED PROVIDER NOTE - GASTROINTESTINAL, MLM
Abdomen soft, non-tender, no guarding. Brown stool. No melena. No gross blood. Rectal temp 93.5F (also stool in rectum). Abdomen soft, non-tender, no guarding. Brown stool. No melena. No gross blood. Guaiac obtained by me and to be sent. Rectal temp 93.5F (also stool in rectum).

## 2022-01-09 NOTE — ED PROVIDER NOTE - RESPIRATORY, MLM
Breath sounds clear and equal bilaterally. Breath sounds clear. Diminished right side. No rales or crackles.

## 2022-01-09 NOTE — ED PROVIDER NOTE - NEUROLOGICAL LEVEL OF CONSCIOUSNESS
lethargic./CONFUSED lethargic but movement of all 4 extremities. Non purposeful. Also pushing providers away. Not following commands./CONFUSED

## 2022-01-09 NOTE — ED PROVIDER NOTE - CARE PLAN
1 Principal Discharge DX:	Coffee ground emesis  Secondary Diagnosis:	Altered mental status  Secondary Diagnosis:	Hyperkalemia  Secondary Diagnosis:	Dehydration  Secondary Diagnosis:	Sepsis with metabolic encephalopathy

## 2022-01-10 LAB
CULTURE RESULTS: SIGNIFICANT CHANGE UP
SPECIMEN SOURCE: SIGNIFICANT CHANGE UP

## 2022-01-14 LAB
CULTURE RESULTS: SIGNIFICANT CHANGE UP
CULTURE RESULTS: SIGNIFICANT CHANGE UP
SPECIMEN SOURCE: SIGNIFICANT CHANGE UP
SPECIMEN SOURCE: SIGNIFICANT CHANGE UP

## 2023-06-13 NOTE — ED PROVIDER NOTE - WR ORDER NAME 3
Health Maintenance Due   Topic Date Due    Pneumococcal Vaccines (Age 0-64) (1 - PCV) Never done    TETANUS VACCINE  Never done    Colorectal Cancer Screening  10/17/2022    Shingles Vaccine (2 of 2) 11/11/2022    Eye Exam  11/19/2022    Foot Exam  12/02/2022    Mammogram  12/22/2022    Hemoglobin A1c  06/13/2023       Xray Pelvis AP only

## 2024-01-03 NOTE — ED PROVIDER NOTE - MDM ORDERS SUBMITTED SELECTION
Conjuntivae and eyelids appear normal, Sclerae : White without injection
Labs/EKG/Imaging Studies/Medications